# Patient Record
Sex: FEMALE | Race: WHITE
[De-identification: names, ages, dates, MRNs, and addresses within clinical notes are randomized per-mention and may not be internally consistent; named-entity substitution may affect disease eponyms.]

---

## 2020-03-03 ENCOUNTER — HOSPITAL ENCOUNTER (OUTPATIENT)
Dept: HOSPITAL 53 - M SMT | Age: 65
End: 2020-03-03
Attending: NURSE PRACTITIONER
Payer: COMMERCIAL

## 2020-03-03 DIAGNOSIS — R31.0: Primary | ICD-10-CM

## 2020-03-03 LAB
APPEARANCE UR: CLEAR
BACTERIA UR QL AUTO: NEGATIVE
BILIRUB UR QL STRIP.AUTO: NEGATIVE
GLUCOSE UR QL STRIP.AUTO: NEGATIVE MG/DL
HGB UR QL STRIP.AUTO: NEGATIVE
KETONES UR QL STRIP.AUTO: NEGATIVE MG/DL
LEUKOCYTE ESTERASE UR QL STRIP.AUTO: (no result)
NITRITE UR QL STRIP.AUTO: NEGATIVE
PH UR STRIP.AUTO: 8 UNITS (ref 5–9)
PROT UR QL STRIP.AUTO: NEGATIVE MG/DL
RBC # UR AUTO: 0 /HPF (ref 0–3)
SP GR UR STRIP.AUTO: 1.01 (ref 1–1.03)
SQUAMOUS #/AREA URNS AUTO: 2 /HPF (ref 0–6)
UROBILINOGEN UR QL STRIP.AUTO: 0.2 MG/DL (ref 0–2)
WBC #/AREA URNS AUTO: 2 /HPF (ref 0–3)

## 2020-05-04 ENCOUNTER — HOSPITAL ENCOUNTER (OUTPATIENT)
Dept: HOSPITAL 53 - M LABSMTC | Age: 65
End: 2020-05-04
Attending: ANESTHESIOLOGY
Payer: COMMERCIAL

## 2020-05-04 DIAGNOSIS — Z11.59: Primary | ICD-10-CM

## 2020-05-04 DIAGNOSIS — Z01.818: ICD-10-CM

## 2020-05-07 ENCOUNTER — HOSPITAL ENCOUNTER (OUTPATIENT)
Dept: HOSPITAL 53 - M SDC | Age: 65
Discharge: HOME | End: 2020-05-07
Attending: UROLOGY
Payer: COMMERCIAL

## 2020-05-07 VITALS — DIASTOLIC BLOOD PRESSURE: 77 MMHG | SYSTOLIC BLOOD PRESSURE: 160 MMHG

## 2020-05-07 VITALS — WEIGHT: 152.8 LBS | HEIGHT: 62 IN | BODY MASS INDEX: 28.12 KG/M2

## 2020-05-07 DIAGNOSIS — R31.0: Primary | ICD-10-CM

## 2020-05-07 DIAGNOSIS — Z87.891: ICD-10-CM

## 2020-05-07 PROCEDURE — 52005 CYSTO W/URTRL CATHJ: CPT

## 2020-05-07 PROCEDURE — 74420 UROGRAPHY RTRGR +-KUB: CPT

## 2020-05-07 NOTE — REP
RETROGRADE PYELOGRAM:  Seven views.

 

HISTORY:  Cystoscopy.  Bladder biopsy.

 

6 seconds of fluoroscopy time is reported.

 

FINDINGS:  A sequence of seven last image hold fluoroscopically obtained spot

radiographs of the abdomen document bilateral ureteral cannulation and contrast

injection.  No laterality markers are visible.

 

 

Electronically Signed by

Hansel Deutsch MD 05/07/2020 01:30 P

## 2020-05-10 NOTE — RO
DATE OF PROCEDURE:  05/07/2020

 

PREPROCEDURE DIAGNOSES:  Gross hematuria, possible bladder mass.

 

POSTPROCEDURE DIAGNOSIS:  Gross hematuria.

 

PROCEDURE:  Cystoscopy, bilateral retrograde pyelogram with intraoperative

interpretive images.

 

SURGEON:  Raji Wharton MD

 

ASSISTANT:  None.

 

ANESTHESIA:  General.

 

OPERATIVE INDICATIONS:  This is a 64-year-old female who, while being worked up

for gross hematuria, was found to have a possible bladder mass on her CAT scan.

It was recommended she be brought to the operating room today to investigate

this.

 

DESCRIPTION OF PROCEDURE:  The patient was brought to the operating room and

general anesthesia induced. Prophylactic antibiotics were infused. She was then

placed in dorsal lithotomy position, prepped and draped in the usual sterile

fashion.

 

A rigid cystoscope was inserted into the urethral meatus and advanced to the

bladder. The bladder was then thoroughly examined with both the 30- and the

70-degree lenses. No bladder masses were seen. There were no active bleeding 
sites

in the bladder. There were no bladder stones. Bilateral ureteral orifices were

orthotopic and both effluxed clear urine.

 

At this point, a #5-[place] open-end ureteral catheter was utilized to obtain

bilateral retrograde pyelograms. Both were negative for extravasation or

hydronephrosis. There were no filling defects. Since no abnormalities were seen,

this marked conclusion of procedure. The patient's bladder was then emptied of

all fluids, and this marked conclusion of procedure.

 

The patient was then taken out of dorsal lithotomy position, awakened from

anesthesia, and transported to recovery room in stable condition.

 

ESTIMATED BLOOD LOSS:  5 mL.

 

COMPLICATIONS:  None.

 

SPECIMENS:  None.

 

PLAN:  The patient will followup in the clinic in a few weeks.

JASMINE